# Patient Record
Sex: FEMALE | Race: WHITE | NOT HISPANIC OR LATINO
[De-identification: names, ages, dates, MRNs, and addresses within clinical notes are randomized per-mention and may not be internally consistent; named-entity substitution may affect disease eponyms.]

---

## 2021-05-24 ENCOUNTER — NON-APPOINTMENT (OUTPATIENT)
Age: 31
End: 2021-05-24

## 2021-06-14 ENCOUNTER — TRANSCRIPTION ENCOUNTER (OUTPATIENT)
Age: 31
End: 2021-06-14

## 2021-06-14 ENCOUNTER — APPOINTMENT (OUTPATIENT)
Dept: OBGYN | Facility: CLINIC | Age: 31
End: 2021-06-14
Payer: COMMERCIAL

## 2021-06-14 ENCOUNTER — NON-APPOINTMENT (OUTPATIENT)
Age: 31
End: 2021-06-14

## 2021-06-14 DIAGNOSIS — Z30.9 ENCOUNTER FOR CONTRACEPTIVE MANAGEMENT, UNSPECIFIED: ICD-10-CM

## 2021-06-14 PROCEDURE — 99072 ADDL SUPL MATRL&STAF TM PHE: CPT

## 2021-06-14 PROCEDURE — 99385 PREV VISIT NEW AGE 18-39: CPT

## 2022-04-04 PROBLEM — Z00.00 ENCOUNTER FOR PREVENTIVE HEALTH EXAMINATION: Status: ACTIVE | Noted: 2022-04-04

## 2022-04-04 PROBLEM — Z30.9 ENCOUNTER FOR BIRTH CONTROL: Status: RESOLVED | Noted: 2022-04-04 | Resolved: 2022-04-18

## 2022-06-21 ENCOUNTER — APPOINTMENT (OUTPATIENT)
Dept: OBGYN | Facility: CLINIC | Age: 32
End: 2022-06-21
Payer: COMMERCIAL

## 2022-06-21 VITALS
HEART RATE: 109 BPM | WEIGHT: 160 LBS | SYSTOLIC BLOOD PRESSURE: 111 MMHG | HEIGHT: 65 IN | DIASTOLIC BLOOD PRESSURE: 76 MMHG | BODY MASS INDEX: 26.66 KG/M2

## 2022-06-21 PROCEDURE — 99395 PREV VISIT EST AGE 18-39: CPT

## 2022-06-21 NOTE — END OF VISIT
[FreeTextEntry3] : I, Ceci Harshal, acted as a scribe on behalf of Dr. Naomi Calderon on 06/21/2022 \par \par All medical entries made by the scribe were at my, Dr. Naomi Calderon, direction and personally dictated by me on 06/21/2022 . I have reviewed the chart and agree that the record accurately reflects my personal performance of the history, physical exam, assessment and plan. I have also personally directed, reviewed, and agreed with the chart.\par

## 2022-06-21 NOTE — PLAN
[FreeTextEntry1] : 30 y/o  LMP 22 female presents for annual wellness visit.\par \par Contraception: \par -refill OCP\par \par HCM\par -Pap up to date\par -Denies STI testing. Safe sexual practices discussed.\par -f/u PCP for recommended HCM, vaccinations and CA screening\par -RTO 1 year\par \par \par \par

## 2022-06-21 NOTE — HISTORY OF PRESENT ILLNESS
[Patient reported PAP Smear was normal] : Patient reported PAP Smear was normal [FreeTextEntry1] : 30 y/o  LMP 22 female presents for annual wellness visit. Last seen in , neg pap. Pt takes OCP Kaitlib and is doing well. Pt got  in September. Pt will consider attempting to conceive next year. Pt is going to [facility] in September. \par \par Current meds: OCP Kaitlib\par FamHx: melanoma (mother) [TextBox_4] : Annual visit  [PapSmeardate] : 2021 [LMPDate] : 06/01/22

## 2022-06-21 NOTE — COUNSELING
[Nutrition/ Exercise/ Weight Management] : nutrition, exercise, weight management [Vitamins/Supplements] : vitamins/supplements [Sunscreen] : sunscreen [Contraception/ Emergency Contraception/ Safe Sexual Practices] : contraception, emergency contraception, safe sexual practices

## 2022-10-03 ENCOUNTER — TRANSCRIPTION ENCOUNTER (OUTPATIENT)
Age: 32
End: 2022-10-03

## 2023-06-26 ENCOUNTER — APPOINTMENT (OUTPATIENT)
Dept: OBGYN | Facility: CLINIC | Age: 33
End: 2023-06-26
Payer: COMMERCIAL

## 2023-06-26 VITALS
HEART RATE: 98 BPM | BODY MASS INDEX: 25.16 KG/M2 | DIASTOLIC BLOOD PRESSURE: 76 MMHG | HEIGHT: 65 IN | OXYGEN SATURATION: 99 % | WEIGHT: 151 LBS | SYSTOLIC BLOOD PRESSURE: 114 MMHG

## 2023-06-26 DIAGNOSIS — Z01.419 ENCOUNTER FOR GYNECOLOGICAL EXAMINATION (GENERAL) (ROUTINE) W/OUT ABNORMAL FINDINGS: ICD-10-CM

## 2023-06-26 DIAGNOSIS — Z11.51 ENCOUNTER FOR SCREENING FOR HUMAN PAPILLOMAVIRUS (HPV): ICD-10-CM

## 2023-06-26 PROCEDURE — 99395 PREV VISIT EST AGE 18-39: CPT

## 2023-06-26 RX ORDER — NORETHINDRONE AND ETHINYL ESTRADIOL 0.8-25(24)
0.8-25 KIT ORAL
Qty: 3 | Refills: 3 | Status: ACTIVE | COMMUNITY
Start: 2022-04-04 | End: 1900-01-01

## 2023-06-26 NOTE — PLAN
[FreeTextEntry1] : 31 y/o Pt here for HUGO\par \par -Pap and HPV done\par -Advised prenatal vitamin\par -start ovulation tracking once off OCP\par -Continue OCP for now\par -RTO in one year

## 2023-06-26 NOTE — HISTORY OF PRESENT ILLNESS
[FreeTextEntry1] : 31 y/o G0 presenting for annual exam. Pt was last seen June 2022. Last pap was in June 2021 and was neg. Pt takes OCP and is . Pt plans to try to conceive in the fall and is planning on buying a home on [place]. \par \par

## 2023-06-28 LAB — HPV HIGH+LOW RISK DNA PNL CVX: NOT DETECTED

## 2023-07-01 ENCOUNTER — TRANSCRIPTION ENCOUNTER (OUTPATIENT)
Age: 33
End: 2023-07-01

## 2023-07-01 LAB — CYTOLOGY CVX/VAG DOC THIN PREP: NORMAL

## 2023-07-13 ENCOUNTER — APPOINTMENT (OUTPATIENT)
Dept: PLASTIC SURGERY | Facility: CLINIC | Age: 33
End: 2023-07-13

## 2023-11-28 ENCOUNTER — APPOINTMENT (OUTPATIENT)
Dept: OBGYN | Facility: CLINIC | Age: 33
End: 2023-11-28
Payer: COMMERCIAL

## 2023-11-28 VITALS
HEART RATE: 93 BPM | SYSTOLIC BLOOD PRESSURE: 108 MMHG | BODY MASS INDEX: 26.99 KG/M2 | WEIGHT: 162 LBS | DIASTOLIC BLOOD PRESSURE: 71 MMHG | OXYGEN SATURATION: 100 % | HEIGHT: 65 IN

## 2023-11-28 DIAGNOSIS — D64.9 ANEMIA, UNSPECIFIED: ICD-10-CM

## 2023-11-28 DIAGNOSIS — Z32.01 ENCOUNTER FOR PREGNANCY TEST, RESULT POSITIVE: ICD-10-CM

## 2023-11-28 DIAGNOSIS — E55.9 VITAMIN D DEFICIENCY, UNSPECIFIED: ICD-10-CM

## 2023-11-28 DIAGNOSIS — Z11.59 ENCOUNTER FOR SCREENING FOR OTHER VIRAL DISEASES: ICD-10-CM

## 2023-11-28 DIAGNOSIS — Z11.4 ENCOUNTER FOR SCREENING FOR HUMAN IMMUNODEFICIENCY VIRUS [HIV]: ICD-10-CM

## 2023-11-28 DIAGNOSIS — N39.0 URINARY TRACT INFECTION, SITE NOT SPECIFIED: ICD-10-CM

## 2023-11-28 DIAGNOSIS — Z13.29 ENCOUNTER FOR SCREENING FOR OTHER SUSPECTED ENDOCRINE DISORDER: ICD-10-CM

## 2023-11-28 DIAGNOSIS — Z11.3 ENCOUNTER FOR SCREENING FOR INFECTIONS WITH A PREDOMINANTLY SEXUAL MODE OF TRANSMISSION: ICD-10-CM

## 2023-11-28 DIAGNOSIS — Z13.88 ENCOUNTER FOR SCREENING FOR DISORDER DUE TO EXPOSURE TO CONTAMINANTS: ICD-10-CM

## 2023-11-28 DIAGNOSIS — Z01.83 ENCOUNTER FOR BLOOD TYPING: ICD-10-CM

## 2023-11-28 DIAGNOSIS — Z13.21 ENCOUNTER FOR SCREENING FOR NUTRITIONAL DISORDER: ICD-10-CM

## 2023-11-28 DIAGNOSIS — Z01.84 ENCOUNTER FOR ANTIBODY RESPONSE EXAMINATION: ICD-10-CM

## 2023-11-28 DIAGNOSIS — Z11.8 ENCOUNTER FOR SCREENING FOR OTHER INFECTIOUS AND PARASITIC DISEASES: ICD-10-CM

## 2023-11-28 DIAGNOSIS — N64.4 MASTODYNIA: ICD-10-CM

## 2023-11-28 DIAGNOSIS — R73.9 HYPERGLYCEMIA, UNSPECIFIED: ICD-10-CM

## 2023-11-28 DIAGNOSIS — R11.0 NAUSEA: ICD-10-CM

## 2023-11-28 PROCEDURE — 76817 TRANSVAGINAL US OBSTETRIC: CPT

## 2023-11-28 PROCEDURE — 36415 COLL VENOUS BLD VENIPUNCTURE: CPT

## 2023-11-28 PROCEDURE — 99213 OFFICE O/P EST LOW 20 MIN: CPT | Mod: 25

## 2023-11-29 LAB
ABO + RH PNL BLD: NORMAL
BLD GP AB SCN SERPL QL: NORMAL
ESTIMATED AVERAGE GLUCOSE: 97 MG/DL
HBA1C MFR BLD HPLC: 5 %
HBV SURFACE AG SER QL: NONREACTIVE
HIV1+2 AB SPEC QL IA.RAPID: NONREACTIVE

## 2023-11-30 LAB
25(OH)D3 SERPL-MCNC: 39.1 NG/ML
BACTERIA UR CULT: NORMAL
C TRACH RRNA SPEC QL NAA+PROBE: NOT DETECTED
HGB A MFR BLD: 97.4 %
HGB A2 MFR BLD: 2.6 %
HGB FRACT BLD-IMP: NORMAL
LEAD BLD-MCNC: <1 UG/DL
MEV IGG FLD QL IA: 211 AU/ML
MEV IGG+IGM SER-IMP: POSITIVE
MUV AB SER-ACNC: POSITIVE
MUV IGG SER QL IA: 80.8 AU/ML
N GONORRHOEA RRNA SPEC QL NAA+PROBE: NOT DETECTED
RUBV IGG FLD-ACNC: 7.7 INDEX
RUBV IGG SER-IMP: POSITIVE
SOURCE AMPLIFICATION: NORMAL
T PALLIDUM AB SER QL IA: NEGATIVE
TSH SERPL-ACNC: 1.87 UIU/ML
VZV AB TITR SER: POSITIVE
VZV IGG SER IF-ACNC: 1304 INDEX

## 2023-12-04 LAB
B19V IGG SER QL IA: NEGATIVE
B19V IGG+IGM SER-IMP: NORMAL
B19V IGM FLD-ACNC: NEGATIVE

## 2023-12-06 ENCOUNTER — NON-APPOINTMENT (OUTPATIENT)
Age: 33
End: 2023-12-06

## 2023-12-07 ENCOUNTER — NON-APPOINTMENT (OUTPATIENT)
Age: 33
End: 2023-12-07

## 2023-12-26 ENCOUNTER — NON-APPOINTMENT (OUTPATIENT)
Age: 33
End: 2023-12-26

## 2023-12-26 ENCOUNTER — TRANSCRIPTION ENCOUNTER (OUTPATIENT)
Age: 33
End: 2023-12-26

## 2023-12-31 PROBLEM — Z11.3 ENCOUNTER FOR SCREENING EXAMINATION FOR SEXUALLY TRANSMITTED DISEASE: Status: RESOLVED | Noted: 2023-11-28 | Resolved: 2023-12-12

## 2024-01-04 ENCOUNTER — APPOINTMENT (OUTPATIENT)
Dept: OBGYN | Facility: CLINIC | Age: 34
End: 2024-01-04
Payer: COMMERCIAL

## 2024-01-04 ENCOUNTER — NON-APPOINTMENT (OUTPATIENT)
Age: 34
End: 2024-01-04

## 2024-01-04 ENCOUNTER — ASOB RESULT (OUTPATIENT)
Age: 34
End: 2024-01-04

## 2024-01-04 DIAGNOSIS — Z34.01 ENCOUNTER FOR SUPERVISION OF NORMAL FIRST PREGNANCY, FIRST TRIMESTER: ICD-10-CM

## 2024-01-04 PROCEDURE — 0500F INITIAL PRENATAL CARE VISIT: CPT

## 2024-01-04 PROCEDURE — 36415 COLL VENOUS BLD VENIPUNCTURE: CPT

## 2024-01-04 PROCEDURE — 76801 OB US < 14 WKS SINGLE FETUS: CPT

## 2024-01-04 PROCEDURE — 76813 OB US NUCHAL MEAS 1 GEST: CPT

## 2024-01-14 ENCOUNTER — NON-APPOINTMENT (OUTPATIENT)
Age: 34
End: 2024-01-14

## 2024-01-14 ENCOUNTER — TRANSCRIPTION ENCOUNTER (OUTPATIENT)
Age: 34
End: 2024-01-14

## 2024-01-30 ENCOUNTER — APPOINTMENT (OUTPATIENT)
Dept: OBGYN | Facility: CLINIC | Age: 34
End: 2024-01-30
Payer: COMMERCIAL

## 2024-01-30 VITALS
HEIGHT: 65 IN | BODY MASS INDEX: 27.66 KG/M2 | SYSTOLIC BLOOD PRESSURE: 114 MMHG | DIASTOLIC BLOOD PRESSURE: 71 MMHG | HEART RATE: 99 BPM | WEIGHT: 166 LBS

## 2024-01-30 PROCEDURE — 0502F SUBSEQUENT PRENATAL CARE: CPT

## 2024-01-30 PROCEDURE — 36415 COLL VENOUS BLD VENIPUNCTURE: CPT

## 2024-02-06 LAB
AFP MOM: 1.04
AFP VALUE: 35.5 NG/ML
ALPHA FETOPROTEIN SERUM COMMENT: NORMAL
ALPHA FETOPROTEIN SERUM INTERPRETATION: NORMAL
ALPHA FETOPROTEIN SERUM RESULTS: NORMAL
ALPHA FETOPROTEIN SERUM TEST RESULTS: NORMAL
GESTATIONAL AGE BASED ON: NORMAL
GESTATIONAL AGE ON COLLECTION DATE: 16.6 WEEKS
INSULIN DEP DIABETES: NO
MATERNAL AGE AT EDD AFP: 32 YR
MULTIPLE GESTATION: NO
OSBR RISK 1 IN: NORMAL
RACE: NORMAL
WEIGHT AFP: 166 LBS

## 2024-02-29 ENCOUNTER — ASOB RESULT (OUTPATIENT)
Age: 34
End: 2024-02-29

## 2024-02-29 ENCOUNTER — APPOINTMENT (OUTPATIENT)
Dept: ANTEPARTUM | Facility: CLINIC | Age: 34
End: 2024-02-29
Payer: COMMERCIAL

## 2024-02-29 PROCEDURE — 76811 OB US DETAILED SNGL FETUS: CPT

## 2024-03-05 ENCOUNTER — APPOINTMENT (OUTPATIENT)
Dept: OBGYN | Facility: CLINIC | Age: 34
End: 2024-03-05
Payer: COMMERCIAL

## 2024-03-05 ENCOUNTER — NON-APPOINTMENT (OUTPATIENT)
Age: 34
End: 2024-03-05

## 2024-03-05 PROCEDURE — 0502F SUBSEQUENT PRENATAL CARE: CPT

## 2024-04-02 ENCOUNTER — TRANSCRIPTION ENCOUNTER (OUTPATIENT)
Age: 34
End: 2024-04-02

## 2024-04-12 ENCOUNTER — APPOINTMENT (OUTPATIENT)
Dept: OBGYN | Facility: CLINIC | Age: 34
End: 2024-04-12
Payer: COMMERCIAL

## 2024-04-12 DIAGNOSIS — Z34.92 ENCOUNTER FOR SUPERVISION OF NORMAL PREGNANCY, UNSPECIFIED, SECOND TRIMESTER: ICD-10-CM

## 2024-04-12 PROCEDURE — 0502F SUBSEQUENT PRENATAL CARE: CPT

## 2024-04-12 PROCEDURE — 36415 COLL VENOUS BLD VENIPUNCTURE: CPT

## 2024-04-15 LAB
25(OH)D3 SERPL-MCNC: 47.5 NG/ML
BASOPHILS # BLD AUTO: 0.04 K/UL
BASOPHILS NFR BLD AUTO: 0.5 %
BLD GP AB SCN SERPL QL: NORMAL
EOSINOPHIL # BLD AUTO: 0.04 K/UL
EOSINOPHIL NFR BLD AUTO: 0.5 %
GLUCOSE 1H P 50 G GLC PO SERPL-MCNC: 88 MG/DL
HCT VFR BLD CALC: 32.6 %
HGB BLD-MCNC: 11.1 G/DL
HIV1+2 AB SPEC QL IA.RAPID: NONREACTIVE
IMM GRANULOCYTES NFR BLD AUTO: 0.7 %
LYMPHOCYTES # BLD AUTO: 1.38 K/UL
LYMPHOCYTES NFR BLD AUTO: 16.1 %
MAN DIFF?: NORMAL
MCHC RBC-ENTMCNC: 31.6 PG
MCHC RBC-ENTMCNC: 34 GM/DL
MCV RBC AUTO: 92.9 FL
MONOCYTES # BLD AUTO: 0.59 K/UL
MONOCYTES NFR BLD AUTO: 6.9 %
NEUTROPHILS # BLD AUTO: 6.44 K/UL
NEUTROPHILS NFR BLD AUTO: 75.3 %
PLATELET # BLD AUTO: 210 K/UL
RBC # BLD: 3.51 M/UL
RBC # FLD: 12.5 %
T PALLIDUM AB SER QL IA: NEGATIVE
WBC # FLD AUTO: 8.55 K/UL

## 2024-05-06 ENCOUNTER — APPOINTMENT (OUTPATIENT)
Dept: OBGYN | Facility: CLINIC | Age: 34
End: 2024-05-06
Payer: COMMERCIAL

## 2024-05-06 ENCOUNTER — NON-APPOINTMENT (OUTPATIENT)
Age: 34
End: 2024-05-06

## 2024-05-06 DIAGNOSIS — Z23 ENCOUNTER FOR IMMUNIZATION: ICD-10-CM

## 2024-05-06 PROCEDURE — 90715 TDAP VACCINE 7 YRS/> IM: CPT

## 2024-05-06 PROCEDURE — 0502F SUBSEQUENT PRENATAL CARE: CPT

## 2024-05-06 PROCEDURE — 90471 IMMUNIZATION ADMIN: CPT

## 2024-05-21 ENCOUNTER — NON-APPOINTMENT (OUTPATIENT)
Age: 34
End: 2024-05-21

## 2024-05-22 ENCOUNTER — NON-APPOINTMENT (OUTPATIENT)
Age: 34
End: 2024-05-22

## 2024-05-22 ENCOUNTER — APPOINTMENT (OUTPATIENT)
Dept: OBGYN | Facility: CLINIC | Age: 34
End: 2024-05-22
Payer: COMMERCIAL

## 2024-05-22 ENCOUNTER — ASOB RESULT (OUTPATIENT)
Age: 34
End: 2024-05-22

## 2024-05-22 PROCEDURE — 0502F SUBSEQUENT PRENATAL CARE: CPT

## 2024-05-22 PROCEDURE — 76816 OB US FOLLOW-UP PER FETUS: CPT

## 2024-05-22 PROCEDURE — 76819 FETAL BIOPHYS PROFIL W/O NST: CPT | Mod: 59

## 2024-06-05 ENCOUNTER — NON-APPOINTMENT (OUTPATIENT)
Age: 34
End: 2024-06-05

## 2024-06-05 ENCOUNTER — APPOINTMENT (OUTPATIENT)
Dept: OBGYN | Facility: CLINIC | Age: 34
End: 2024-06-05
Payer: COMMERCIAL

## 2024-06-05 PROCEDURE — 0502F SUBSEQUENT PRENATAL CARE: CPT

## 2024-06-17 ENCOUNTER — NON-APPOINTMENT (OUTPATIENT)
Age: 34
End: 2024-06-17

## 2024-06-17 ENCOUNTER — APPOINTMENT (OUTPATIENT)
Dept: OBGYN | Facility: CLINIC | Age: 34
End: 2024-06-17
Payer: COMMERCIAL

## 2024-06-17 DIAGNOSIS — Z36.85 ENCOUNTER FOR ANTENATAL SCREENING FOR STREPTOCOCCUS B: ICD-10-CM

## 2024-06-17 PROCEDURE — 0502F SUBSEQUENT PRENATAL CARE: CPT

## 2024-06-25 LAB
GP B STREP DNA SPEC QL NAA+PROBE: NOT DETECTED
SOURCE GBS: NORMAL

## 2024-06-26 ENCOUNTER — NON-APPOINTMENT (OUTPATIENT)
Age: 34
End: 2024-06-26

## 2024-06-26 ENCOUNTER — APPOINTMENT (OUTPATIENT)
Dept: OBGYN | Facility: CLINIC | Age: 34
End: 2024-06-26
Payer: COMMERCIAL

## 2024-06-26 PROCEDURE — 0502F SUBSEQUENT PRENATAL CARE: CPT

## 2024-07-01 ENCOUNTER — APPOINTMENT (OUTPATIENT)
Dept: OBGYN | Facility: CLINIC | Age: 34
End: 2024-07-01
Payer: COMMERCIAL

## 2024-07-01 DIAGNOSIS — Z34.93 ENCOUNTER FOR SUPERVISION OF NORMAL PREGNANCY, UNSPECIFIED, THIRD TRIMESTER: ICD-10-CM

## 2024-07-01 PROCEDURE — 0502F SUBSEQUENT PRENATAL CARE: CPT

## 2024-07-05 ENCOUNTER — OUTPATIENT (OUTPATIENT)
Dept: OUTPATIENT SERVICES | Facility: HOSPITAL | Age: 34
LOS: 1 days | End: 2024-07-05
Payer: COMMERCIAL

## 2024-07-05 VITALS
TEMPERATURE: 98 F | HEART RATE: 111 BPM | DIASTOLIC BLOOD PRESSURE: 70 MMHG | RESPIRATION RATE: 16 BRPM | SYSTOLIC BLOOD PRESSURE: 120 MMHG

## 2024-07-05 VITALS
RESPIRATION RATE: 16 BRPM | TEMPERATURE: 98 F | DIASTOLIC BLOOD PRESSURE: 61 MMHG | HEART RATE: 96 BPM | SYSTOLIC BLOOD PRESSURE: 132 MMHG

## 2024-07-05 DIAGNOSIS — O26.899 OTHER SPECIFIED PREGNANCY RELATED CONDITIONS, UNSPECIFIED TRIMESTER: ICD-10-CM

## 2024-07-05 PROCEDURE — 83986 ASSAY PH BODY FLUID NOS: CPT

## 2024-07-05 PROCEDURE — 59025 FETAL NON-STRESS TEST: CPT

## 2024-07-05 PROCEDURE — G0463: CPT

## 2024-07-08 ENCOUNTER — NON-APPOINTMENT (OUTPATIENT)
Age: 34
End: 2024-07-08

## 2024-07-08 ENCOUNTER — APPOINTMENT (OUTPATIENT)
Dept: OBGYN | Facility: CLINIC | Age: 34
End: 2024-07-08
Payer: COMMERCIAL

## 2024-07-08 DIAGNOSIS — Z03.71 ENCOUNTER FOR SUSPECTED PROBLEM WITH AMNIOTIC CAVITY AND MEMBRANE RULED OUT: ICD-10-CM

## 2024-07-08 DIAGNOSIS — O47.1 FALSE LABOR AT OR AFTER 37 COMPLETED WEEKS OF GESTATION: ICD-10-CM

## 2024-07-08 DIAGNOSIS — Z3A.39 39 WEEKS GESTATION OF PREGNANCY: ICD-10-CM

## 2024-07-08 PROCEDURE — 0502F SUBSEQUENT PRENATAL CARE: CPT

## 2024-07-11 ENCOUNTER — INPATIENT (INPATIENT)
Facility: HOSPITAL | Age: 34
LOS: 1 days | Discharge: HOME | DRG: 951 | End: 2024-07-13
Attending: OBSTETRICS & GYNECOLOGY | Admitting: OBSTETRICS & GYNECOLOGY
Payer: COMMERCIAL

## 2024-07-11 VITALS
DIASTOLIC BLOOD PRESSURE: 65 MMHG | OXYGEN SATURATION: 95 % | TEMPERATURE: 99 F | HEART RATE: 105 BPM | SYSTOLIC BLOOD PRESSURE: 122 MMHG | RESPIRATION RATE: 17 BRPM

## 2024-07-11 DIAGNOSIS — Z3A.39 39 WEEKS GESTATION OF PREGNANCY: ICD-10-CM

## 2024-07-11 DIAGNOSIS — O26.899 OTHER SPECIFIED PREGNANCY RELATED CONDITIONS, UNSPECIFIED TRIMESTER: ICD-10-CM

## 2024-07-11 DIAGNOSIS — Z34.80 ENCOUNTER FOR SUPERVISION OF OTHER NORMAL PREGNANCY, UNSPECIFIED TRIMESTER: ICD-10-CM

## 2024-07-11 PROBLEM — Z78.9 OTHER SPECIFIED HEALTH STATUS: Chronic | Status: ACTIVE | Noted: 2024-07-05

## 2024-07-11 LAB
BASOPHILS # BLD AUTO: 0.03 K/UL — SIGNIFICANT CHANGE UP (ref 0–0.2)
BASOPHILS NFR BLD AUTO: 0.4 % — SIGNIFICANT CHANGE UP (ref 0–2)
BLD GP AB SCN SERPL QL: NEGATIVE — SIGNIFICANT CHANGE UP
EOSINOPHIL # BLD AUTO: 0.03 K/UL — SIGNIFICANT CHANGE UP (ref 0–0.5)
EOSINOPHIL NFR BLD AUTO: 0.4 % — SIGNIFICANT CHANGE UP (ref 0–6)
HCT VFR BLD CALC: 33.6 % — LOW (ref 34.5–45)
HCV AB S/CO SERPL IA: 0.05 S/CO — SIGNIFICANT CHANGE UP
HCV AB SERPL-IMP: SIGNIFICANT CHANGE UP
HGB BLD-MCNC: 11.8 G/DL — SIGNIFICANT CHANGE UP (ref 11.5–15.5)
IMM GRANULOCYTES NFR BLD AUTO: 1 % — HIGH (ref 0–0.9)
LYMPHOCYTES # BLD AUTO: 1.43 K/UL — SIGNIFICANT CHANGE UP (ref 1–3.3)
LYMPHOCYTES # BLD AUTO: 18 % — SIGNIFICANT CHANGE UP (ref 13–44)
MCHC RBC-ENTMCNC: 32 PG — SIGNIFICANT CHANGE UP (ref 27–34)
MCHC RBC-ENTMCNC: 35.1 GM/DL — SIGNIFICANT CHANGE UP (ref 32–36)
MCV RBC AUTO: 91.1 FL — SIGNIFICANT CHANGE UP (ref 80–100)
MONOCYTES # BLD AUTO: 0.67 K/UL — SIGNIFICANT CHANGE UP (ref 0–0.9)
MONOCYTES NFR BLD AUTO: 8.4 % — SIGNIFICANT CHANGE UP (ref 2–14)
NEUTROPHILS # BLD AUTO: 5.7 K/UL — SIGNIFICANT CHANGE UP (ref 1.8–7.4)
NEUTROPHILS NFR BLD AUTO: 71.8 % — SIGNIFICANT CHANGE UP (ref 43–77)
NRBC # BLD: 0 /100 WBCS — SIGNIFICANT CHANGE UP (ref 0–0)
NRBC BLD-RTO: 0 /100 WBCS — SIGNIFICANT CHANGE UP (ref 0–0)
PLATELET # BLD AUTO: 157 K/UL — SIGNIFICANT CHANGE UP (ref 150–400)
RBC # BLD: 3.69 M/UL — LOW (ref 3.8–5.2)
RBC # FLD: 13.2 % — SIGNIFICANT CHANGE UP (ref 10.3–14.5)
RH IG SCN BLD-IMP: POSITIVE — SIGNIFICANT CHANGE UP
RH IG SCN BLD-IMP: POSITIVE — SIGNIFICANT CHANGE UP
T PALLIDUM AB TITR SER: NEGATIVE — SIGNIFICANT CHANGE UP
WBC # BLD: 7.94 K/UL — SIGNIFICANT CHANGE UP (ref 3.8–10.5)
WBC # FLD AUTO: 7.94 K/UL — SIGNIFICANT CHANGE UP (ref 3.8–10.5)

## 2024-07-11 PROCEDURE — 59400 OBSTETRICAL CARE: CPT

## 2024-07-11 RX ORDER — WITCH HAZEL LEAF
1 FLUID EXTRACT MISCELLANEOUS EVERY 4 HOURS
Refills: 0 | Status: DISCONTINUED | OUTPATIENT
Start: 2024-07-11 | End: 2024-07-13

## 2024-07-11 RX ORDER — OXYCODONE HYDROCHLORIDE 30 MG/1
5 TABLET ORAL
Refills: 0 | Status: DISCONTINUED | OUTPATIENT
Start: 2024-07-11 | End: 2024-07-13

## 2024-07-11 RX ORDER — SODIUM CHLORIDE 9 G/1000ML
1000 INJECTION, SOLUTION INTRAVENOUS
Refills: 0 | Status: DISCONTINUED | OUTPATIENT
Start: 2024-07-11 | End: 2024-07-11

## 2024-07-11 RX ORDER — OXYTOCIN-SODIUM CHLORIDE 0.9% IV SOLN 30 UNIT/500ML 30-0.9/5 UT/ML-%
41.67 SOLUTION INTRAVENOUS
Qty: 20 | Refills: 0 | Status: DISCONTINUED | OUTPATIENT
Start: 2024-07-11 | End: 2024-07-13

## 2024-07-11 RX ORDER — PRAMOXINE HCL 1 %
1 GEL (GRAM) TOPICAL EVERY 4 HOURS
Refills: 0 | Status: DISCONTINUED | OUTPATIENT
Start: 2024-07-11 | End: 2024-07-13

## 2024-07-11 RX ORDER — DIPHENHYDRAMINE HCL 12.5MG/5ML
25 ELIXIR ORAL EVERY 6 HOURS
Refills: 0 | Status: DISCONTINUED | OUTPATIENT
Start: 2024-07-11 | End: 2024-07-13

## 2024-07-11 RX ORDER — MAGNESIUM HYDROXIDE 400 MG/5ML
30 SUSPENSION ORAL
Refills: 0 | Status: DISCONTINUED | OUTPATIENT
Start: 2024-07-11 | End: 2024-07-13

## 2024-07-11 RX ORDER — BENZOCAINE 220 MG/G
1 SPRAY, METERED PERIODONTAL EVERY 6 HOURS
Refills: 0 | Status: DISCONTINUED | OUTPATIENT
Start: 2024-07-11 | End: 2024-07-13

## 2024-07-11 RX ORDER — PRENATAL 136/IRON/FOLIC ACID 27 MG-1 MG
1 TABLET ORAL DAILY
Refills: 0 | Status: DISCONTINUED | OUTPATIENT
Start: 2024-07-11 | End: 2024-07-13

## 2024-07-11 RX ORDER — KETOROLAC TROMETHAMINE 30 MG/ML
30 INJECTION, SOLUTION INTRAMUSCULAR; INTRAVENOUS ONCE
Refills: 0 | Status: DISCONTINUED | OUTPATIENT
Start: 2024-07-11 | End: 2024-07-11

## 2024-07-11 RX ORDER — SIMETHICONE 80 MG
80 TABLET,CHEWABLE ORAL EVERY 4 HOURS
Refills: 0 | Status: DISCONTINUED | OUTPATIENT
Start: 2024-07-11 | End: 2024-07-13

## 2024-07-11 RX ORDER — MODIFIED LANOLIN 100 %
1 CREAM (GRAM) TOPICAL EVERY 6 HOURS
Refills: 0 | Status: DISCONTINUED | OUTPATIENT
Start: 2024-07-11 | End: 2024-07-13

## 2024-07-11 RX ORDER — HYDROCORTISONE 10 MG/G
1 CREAM TOPICAL EVERY 6 HOURS
Refills: 0 | Status: DISCONTINUED | OUTPATIENT
Start: 2024-07-11 | End: 2024-07-13

## 2024-07-11 RX ORDER — LIDOCAINE HCL/PF 10 MG/ML
5 VIAL (ML) INJECTION ONCE
Refills: 0 | Status: COMPLETED | OUTPATIENT
Start: 2024-07-11 | End: 2024-07-11

## 2024-07-11 RX ORDER — ACETAMINOPHEN 500 MG/5ML
975 LIQUID (ML) ORAL
Refills: 0 | Status: DISCONTINUED | OUTPATIENT
Start: 2024-07-11 | End: 2024-07-13

## 2024-07-11 RX ORDER — OXYCODONE HYDROCHLORIDE 30 MG/1
5 TABLET ORAL ONCE
Refills: 0 | Status: DISCONTINUED | OUTPATIENT
Start: 2024-07-11 | End: 2024-07-13

## 2024-07-11 RX ORDER — OXYTOCIN-SODIUM CHLORIDE 0.9% IV SOLN 30 UNIT/500ML 30-0.9/5 UT/ML-%
4 SOLUTION INTRAVENOUS
Qty: 30 | Refills: 0 | Status: DISCONTINUED | OUTPATIENT
Start: 2024-07-11 | End: 2024-07-13

## 2024-07-11 RX ORDER — TERBUTALINE SULFATE 2.5 MG/1
0.25 TABLET ORAL ONCE
Refills: 0 | Status: COMPLETED | OUTPATIENT
Start: 2024-07-11 | End: 2024-07-11

## 2024-07-11 RX ORDER — CITRIC ACID/SODIUM CITRATE 300-500 MG
15 SOLUTION, ORAL ORAL EVERY 6 HOURS
Refills: 0 | Status: DISCONTINUED | OUTPATIENT
Start: 2024-07-11 | End: 2024-07-11

## 2024-07-11 RX ORDER — IBUPROFEN 200 MG
600 TABLET ORAL EVERY 6 HOURS
Refills: 0 | Status: COMPLETED | OUTPATIENT
Start: 2024-07-11 | End: 2025-06-09

## 2024-07-11 RX ORDER — OXYTOCIN-SODIUM CHLORIDE 0.9% IV SOLN 30 UNIT/500ML 30-0.9/5 UT/ML-%
2 SOLUTION INTRAVENOUS
Qty: 30 | Refills: 0 | Status: DISCONTINUED | OUTPATIENT
Start: 2024-07-11 | End: 2024-07-13

## 2024-07-11 RX ORDER — DIBUCAINE 10 MG/G
1 OINTMENT TOPICAL EVERY 6 HOURS
Refills: 0 | Status: DISCONTINUED | OUTPATIENT
Start: 2024-07-11 | End: 2024-07-13

## 2024-07-11 RX ORDER — OXYTOCIN-SODIUM CHLORIDE 0.9% IV SOLN 30 UNIT/500ML 30-0.9/5 UT/ML-%
333.33 SOLUTION INTRAVENOUS
Qty: 20 | Refills: 0 | Status: DISCONTINUED | OUTPATIENT
Start: 2024-07-11 | End: 2024-07-13

## 2024-07-11 RX ADMIN — Medication 5 MILLILITER(S): at 11:04

## 2024-07-11 RX ADMIN — KETOROLAC TROMETHAMINE 30 MILLIGRAM(S): 30 INJECTION, SOLUTION INTRAMUSCULAR; INTRAVENOUS at 21:38

## 2024-07-11 RX ADMIN — TERBUTALINE SULFATE 0.25 MILLIGRAM(S): 2.5 TABLET ORAL at 11:09

## 2024-07-11 RX ADMIN — Medication 5 MILLILITER(S): at 18:27

## 2024-07-11 RX ADMIN — SODIUM CHLORIDE 125 MILLILITER(S): 9 INJECTION, SOLUTION INTRAVENOUS at 01:35

## 2024-07-11 RX ADMIN — OXYTOCIN-SODIUM CHLORIDE 0.9% IV SOLN 30 UNIT/500ML 4 MILLIUNIT(S)/MIN: 30-0.9/5 SOLUTION at 10:53

## 2024-07-11 RX ADMIN — Medication 975 MILLIGRAM(S): at 23:24

## 2024-07-11 RX ADMIN — SODIUM CHLORIDE 125 MILLILITER(S): 9 INJECTION, SOLUTION INTRAVENOUS at 01:32

## 2024-07-12 RX ORDER — IBUPROFEN 200 MG
600 TABLET ORAL EVERY 6 HOURS
Refills: 0 | Status: DISCONTINUED | OUTPATIENT
Start: 2024-07-12 | End: 2024-07-13

## 2024-07-12 RX ADMIN — Medication 600 MILLIGRAM(S): at 15:49

## 2024-07-12 RX ADMIN — Medication 975 MILLIGRAM(S): at 23:31

## 2024-07-12 RX ADMIN — Medication 600 MILLIGRAM(S): at 20:48

## 2024-07-12 RX ADMIN — Medication 600 MILLIGRAM(S): at 16:19

## 2024-07-12 RX ADMIN — Medication 975 MILLIGRAM(S): at 02:09

## 2024-07-12 RX ADMIN — Medication 600 MILLIGRAM(S): at 02:09

## 2024-07-12 RX ADMIN — Medication 975 MILLIGRAM(S): at 05:22

## 2024-07-12 RX ADMIN — Medication 975 MILLIGRAM(S): at 18:25

## 2024-07-12 RX ADMIN — Medication 975 MILLIGRAM(S): at 17:55

## 2024-07-12 RX ADMIN — Medication 600 MILLIGRAM(S): at 11:00

## 2024-07-12 RX ADMIN — Medication 3 MILLILITER(S): at 16:03

## 2024-07-12 RX ADMIN — Medication 3 MILLILITER(S): at 00:09

## 2024-07-12 RX ADMIN — Medication 1 TABLET(S): at 12:12

## 2024-07-12 RX ADMIN — Medication 975 MILLIGRAM(S): at 13:00

## 2024-07-12 RX ADMIN — Medication 600 MILLIGRAM(S): at 10:10

## 2024-07-12 RX ADMIN — Medication 975 MILLIGRAM(S): at 12:12

## 2024-07-13 ENCOUNTER — TRANSCRIPTION ENCOUNTER (OUTPATIENT)
Age: 34
End: 2024-07-13

## 2024-07-13 VITALS
HEART RATE: 67 BPM | OXYGEN SATURATION: 97 % | DIASTOLIC BLOOD PRESSURE: 74 MMHG | TEMPERATURE: 98 F | SYSTOLIC BLOOD PRESSURE: 108 MMHG | RESPIRATION RATE: 18 BRPM

## 2024-07-13 PROCEDURE — 86803 HEPATITIS C AB TEST: CPT

## 2024-07-13 PROCEDURE — 86900 BLOOD TYPING SEROLOGIC ABO: CPT

## 2024-07-13 PROCEDURE — 59050 FETAL MONITOR W/REPORT: CPT

## 2024-07-13 PROCEDURE — 86850 RBC ANTIBODY SCREEN: CPT

## 2024-07-13 PROCEDURE — 86780 TREPONEMA PALLIDUM: CPT

## 2024-07-13 PROCEDURE — 85025 COMPLETE CBC W/AUTO DIFF WBC: CPT

## 2024-07-13 PROCEDURE — 86901 BLOOD TYPING SEROLOGIC RH(D): CPT

## 2024-07-13 RX ORDER — PRENATAL 136/IRON/FOLIC ACID 27 MG-1 MG
1 TABLET ORAL
Refills: 0 | DISCHARGE

## 2024-07-13 RX ORDER — IBUPROFEN 200 MG
1 TABLET ORAL
Qty: 0 | Refills: 0 | DISCHARGE
Start: 2024-07-13

## 2024-07-13 RX ORDER — ACETAMINOPHEN 500 MG/5ML
3 LIQUID (ML) ORAL
Qty: 0 | Refills: 0 | DISCHARGE
Start: 2024-07-13

## 2024-07-13 RX ADMIN — Medication 600 MILLIGRAM(S): at 03:18

## 2024-07-13 RX ADMIN — Medication 975 MILLIGRAM(S): at 11:30

## 2024-07-13 RX ADMIN — Medication 975 MILLIGRAM(S): at 06:16

## 2024-07-13 RX ADMIN — Medication 975 MILLIGRAM(S): at 12:21

## 2024-07-13 RX ADMIN — Medication 1 TABLET(S): at 11:30

## 2024-07-15 ENCOUNTER — APPOINTMENT (OUTPATIENT)
Dept: OBGYN | Facility: CLINIC | Age: 34
End: 2024-07-15

## 2024-08-22 ENCOUNTER — APPOINTMENT (OUTPATIENT)
Dept: OBGYN | Facility: CLINIC | Age: 34
End: 2024-08-22
Payer: COMMERCIAL

## 2024-08-22 VITALS
HEIGHT: 65 IN | BODY MASS INDEX: 28.32 KG/M2 | DIASTOLIC BLOOD PRESSURE: 80 MMHG | WEIGHT: 170 LBS | SYSTOLIC BLOOD PRESSURE: 121 MMHG

## 2024-08-22 DIAGNOSIS — R10.9 UNSPECIFIED ABDOMINAL PAIN: ICD-10-CM

## 2024-08-22 PROCEDURE — 0503F POSTPARTUM CARE VISIT: CPT

## 2024-08-22 NOTE — END OF VISIT
[FreeTextEntry3] :   I, Candice Lopez, acted as a scribe on behalf of Dr.Susan Heron M.D  on 08/22/2024.   All medical entries made by the scribe were at my,  Dr.Susan Heron M.D ,  direction and personally dictated by me on 08/22/2024. I have reviewed the chart and agree that the record accurately reflects my personal performance of the history, physical exam, assessment and plan. I have also personally directed, reviewed, and agreed with the chart.

## 2024-08-22 NOTE — HISTORY OF PRESENT ILLNESS
[Delivery Date: ___] : on [unfilled] [] : delivered by vaginal delivery [Male] : Delivery History: baby boy [Wt. ___] : weighing [unfilled] [Breastfeeding] : currently nursing [Postpartum Follow Up] : postpartum follow up [Normal] : the vagina was normal [Examination Of The Breasts] : breasts are normal [Doing Well] : is doing well [No Sign of Infection] : is showing no signs of infection [Excellent Pain Control] : has excellent pain control [None] : None [Complications:___] : no complications [S/Sx PP Depression] : no signs/symptoms of postpartum depression [Erythema] : not erythematous [Cervix Sample Taken] : cervical sample not taken for a Pap smear [FreeTextEntry8] : s/p  24 w/ baby boy. Pt is breast-feeding and bottle-feeding. Denies mastitis and PPD. Pt reports right flank and pelvis pain lasting a few minutes, it comes and goes - starting at right flank and radiating to pelvis.    [de-identified] : Del by SA [de-identified] : Breast-feeding and bottle-feeding  [de-identified] : Right Flank and Pelvis pain- Rx renal and pelvic sono, Urine cx done, Urinalysis done 2.PPV- Condoms for contraception, Pt to do Kegels, Consider pelvic floor PT if no imporvment , RTO 2mo for annual

## 2024-08-23 ENCOUNTER — TRANSCRIPTION ENCOUNTER (OUTPATIENT)
Age: 34
End: 2024-08-23

## 2024-08-23 LAB
APPEARANCE: CLEAR
BACTERIA: NEGATIVE /HPF
BILIRUBIN URINE: NEGATIVE
BLOOD URINE: NEGATIVE
CAST: 0 /LPF
COLOR: YELLOW
EPITHELIAL CELLS: 1 /HPF
GLUCOSE QUALITATIVE U: NEGATIVE MG/DL
KETONES URINE: NEGATIVE MG/DL
LEUKOCYTE ESTERASE URINE: NEGATIVE
MICROSCOPIC-UA: NORMAL
NITRITE URINE: NEGATIVE
PH URINE: 6
PROTEIN URINE: NEGATIVE MG/DL
RED BLOOD CELLS URINE: 1 /HPF
SPECIFIC GRAVITY URINE: 1.02
UROBILINOGEN URINE: 0.2 MG/DL
WHITE BLOOD CELLS URINE: 0 /HPF

## 2024-08-24 LAB — BACTERIA UR CULT: NORMAL

## 2024-10-01 ENCOUNTER — TRANSCRIPTION ENCOUNTER (OUTPATIENT)
Age: 34
End: 2024-10-01

## 2024-11-22 ENCOUNTER — APPOINTMENT (OUTPATIENT)
Dept: OBGYN | Facility: CLINIC | Age: 34
End: 2024-11-22
Payer: COMMERCIAL

## 2024-11-22 VITALS
HEIGHT: 65 IN | SYSTOLIC BLOOD PRESSURE: 136 MMHG | BODY MASS INDEX: 29.49 KG/M2 | DIASTOLIC BLOOD PRESSURE: 82 MMHG | WEIGHT: 177 LBS

## 2024-11-22 DIAGNOSIS — N39.3 STRESS INCONTINENCE (FEMALE) (MALE): ICD-10-CM

## 2024-11-22 DIAGNOSIS — Z01.419 ENCOUNTER FOR GYNECOLOGICAL EXAMINATION (GENERAL) (ROUTINE) W/OUT ABNORMAL FINDINGS: ICD-10-CM

## 2024-11-22 PROCEDURE — 99395 PREV VISIT EST AGE 18-39: CPT

## 2025-07-11 ENCOUNTER — APPOINTMENT (OUTPATIENT)
Dept: OBGYN | Facility: CLINIC | Age: 35
End: 2025-07-11
Payer: COMMERCIAL

## 2025-07-11 VITALS
WEIGHT: 174 LBS | HEIGHT: 64 IN | SYSTOLIC BLOOD PRESSURE: 106 MMHG | DIASTOLIC BLOOD PRESSURE: 70 MMHG | BODY MASS INDEX: 29.71 KG/M2

## 2025-07-11 PROBLEM — Z32.00 ENCOUNTER FOR CONFIRMATION OF PREGNANCY TEST RESULT WITH PHYSICAL EXAMINATION: Status: ACTIVE | Noted: 2025-07-11

## 2025-07-11 PROCEDURE — 99214 OFFICE O/P EST MOD 30 MIN: CPT | Mod: 25

## 2025-07-11 PROCEDURE — 76817 TRANSVAGINAL US OBSTETRIC: CPT

## 2025-07-11 PROCEDURE — 36415 COLL VENOUS BLD VENIPUNCTURE: CPT

## 2025-07-15 LAB
25(OH)D3 SERPL-MCNC: 34.5 NG/ML
ABORH: NORMAL
ANTIBODY SCREEN: NORMAL
B19V IGG SER QL IA: 0.09 INDEX
B19V IGG+IGM SER-IMP: NEGATIVE
B19V IGG+IGM SER-IMP: NORMAL
B19V IGM FLD-ACNC: 0.32 INDEX
B19V IGM SER-ACNC: NEGATIVE
BACTERIA UR CULT: ABNORMAL
BASOPHILS # BLD AUTO: 0.03 K/UL
BASOPHILS NFR BLD AUTO: 0.5 %
C TRACH RRNA SPEC QL NAA+PROBE: NOT DETECTED
CYTOLOGY CVX/VAG DOC THIN PREP: NORMAL
EOSINOPHIL # BLD AUTO: 0.04 K/UL
EOSINOPHIL NFR BLD AUTO: 0.6 %
ESTIMATED AVERAGE GLUCOSE: 100 MG/DL
FERRITIN SERPL-MCNC: 71 NG/ML
HBA1C MFR BLD HPLC: 5.1 %
HBV CORE IGG+IGM SER QL: NONREACTIVE
HBV SURFACE AB SER QL: NONREACTIVE
HBV SURFACE AG SER QL: NONREACTIVE
HCT VFR BLD CALC: 40.3 %
HCV AB SER QL: NONREACTIVE
HCV S/CO RATIO: 0.3 S/CO
HGB A MFR BLD: 97.3 %
HGB A2 MFR BLD: 2.7 %
HGB BLD-MCNC: 12.7 G/DL
HGB FRACT BLD-IMP: NORMAL
HIV1+2 AB SPEC QL IA.RAPID: NONREACTIVE
HPV HIGH+LOW RISK DNA PNL CVX: NOT DETECTED
IMM GRANULOCYTES NFR BLD AUTO: 0.2 %
LEAD BLD-MCNC: <1 UG/DL
LYMPHOCYTES # BLD AUTO: 1.76 K/UL
LYMPHOCYTES NFR BLD AUTO: 28.3 %
MAN DIFF?: NORMAL
MCHC RBC-ENTMCNC: 30.7 PG
MCHC RBC-ENTMCNC: 31.5 G/DL
MCV RBC AUTO: 97.3 FL
MEV IGG FLD QL IA: 289 AU/ML
MEV IGG+IGM SER-IMP: POSITIVE
MONOCYTES # BLD AUTO: 0.51 K/UL
MONOCYTES NFR BLD AUTO: 8.2 %
MUV AB SER-ACNC: POSITIVE
MUV IGG SER QL IA: 77.6 AU/ML
N GONORRHOEA RRNA SPEC QL NAA+PROBE: NOT DETECTED
NEUTROPHILS # BLD AUTO: 3.88 K/UL
NEUTROPHILS NFR BLD AUTO: 62.2 %
PLATELET # BLD AUTO: 271 K/UL
RBC # BLD: 4.14 M/UL
RBC # FLD: 12.9 %
RUBV IGG FLD-ACNC: 5.66 INDEX
RUBV IGG SER-IMP: POSITIVE
SOURCE AMPLIFICATION: NORMAL
T PALLIDUM AB SER QL IA: NEGATIVE
TSH SERPL-ACNC: 1.27 UIU/ML
VZV AB TITR SER: POSITIVE
VZV IGG SER IF-ACNC: 8.96 S/CO
WBC # FLD AUTO: 6.23 K/UL

## 2025-07-16 ENCOUNTER — TRANSCRIPTION ENCOUNTER (OUTPATIENT)
Age: 35
End: 2025-07-16

## 2025-07-16 RX ORDER — AMPICILLIN 500 MG/1
500 CAPSULE ORAL
Qty: 14 | Refills: 0 | Status: ACTIVE | COMMUNITY
Start: 2025-07-15 | End: 1900-01-01

## 2025-08-07 ENCOUNTER — NON-APPOINTMENT (OUTPATIENT)
Age: 35
End: 2025-08-07

## 2025-08-08 ENCOUNTER — APPOINTMENT (OUTPATIENT)
Dept: OBGYN | Facility: CLINIC | Age: 35
End: 2025-08-08
Payer: COMMERCIAL

## 2025-08-08 ENCOUNTER — ASOB RESULT (OUTPATIENT)
Age: 35
End: 2025-08-08

## 2025-08-08 VITALS — SYSTOLIC BLOOD PRESSURE: 107 MMHG | BODY MASS INDEX: 29.35 KG/M2 | DIASTOLIC BLOOD PRESSURE: 67 MMHG | WEIGHT: 171 LBS

## 2025-08-08 PROCEDURE — 36415 COLL VENOUS BLD VENIPUNCTURE: CPT

## 2025-08-08 PROCEDURE — 0500F INITIAL PRENATAL CARE VISIT: CPT

## 2025-08-08 PROCEDURE — 76801 OB US < 14 WKS SINGLE FETUS: CPT

## 2025-08-08 PROCEDURE — 76813 OB US NUCHAL MEAS 1 GEST: CPT | Mod: 59

## 2025-08-13 LAB — BACTERIA UR CULT: NORMAL

## 2025-08-18 ENCOUNTER — TRANSCRIPTION ENCOUNTER (OUTPATIENT)
Age: 35
End: 2025-08-18

## 2025-09-05 ENCOUNTER — APPOINTMENT (OUTPATIENT)
Dept: OBGYN | Facility: CLINIC | Age: 35
End: 2025-09-05
Payer: COMMERCIAL

## 2025-09-05 PROCEDURE — 36415 COLL VENOUS BLD VENIPUNCTURE: CPT

## 2025-09-05 PROCEDURE — 0502F SUBSEQUENT PRENATAL CARE: CPT

## 2025-09-08 LAB
2ND TRIMESTER 4 SCREEN SERPL-IMP: NO
AFP ADJ MOM SERPL: 1.01
AFP INTERP SERPL-IMP: NORMAL
AFP INTERP SERPL-IMP: NORMAL
AFP MOM CUT-OFF: 2.5
AFP PERCENTILE: 50.9
AFP SERPL-ACNC: 31.09 NG/ML
CARBAMAZEPINE?: NO
CURRENT SMOKER: NO
DIABETES STATUS PATIENT: NORMAL
GA: NORMAL
GESTATIONAL AGE METHOD: NORMAL
HX OF NTD NARR: NORMAL
MULTIPLE PREGNANCY: NORMAL
NEURAL TUBE DEFECT RISK FETUS: NORMAL
NEURAL TUBE DEFECT RISK POP: NORMAL
TEST PERFORMANCE INFO SPEC: NORMAL
VALPROIC ACID?: NORMAL